# Patient Record
Sex: FEMALE | Race: WHITE | Employment: FULL TIME | URBAN - METROPOLITAN AREA
[De-identification: names, ages, dates, MRNs, and addresses within clinical notes are randomized per-mention and may not be internally consistent; named-entity substitution may affect disease eponyms.]

---

## 2020-05-15 ENCOUNTER — HOSPITAL ENCOUNTER (EMERGENCY)
Age: 25
Discharge: HOME | End: 2020-05-16
Attending: EMERGENCY MEDICINE
Payer: COMMERCIAL

## 2020-05-15 PROCEDURE — 99282 EMERGENCY DEPT VISIT SF MDM: CPT

## 2020-05-15 RX ORDER — SULFAMETHOXAZOLE AND TRIMETHOPRIM 800; 160 MG/1; MG/1
1 TABLET ORAL 2 TIMES DAILY
COMMUNITY
Start: 2020-05-14

## 2020-05-15 RX ORDER — MESALAMINE 1000 MG/1
1000 SUPPOSITORY RECTAL NIGHTLY
COMMUNITY

## 2020-05-15 RX ORDER — MESALAMINE 1.2 G/1
1200 TABLET, DELAYED RELEASE ORAL
COMMUNITY

## 2020-05-15 ASSESSMENT — PAIN SCALES - GENERAL: PAINLEVEL_OUTOF10: 8

## 2020-05-16 VITALS
SYSTOLIC BLOOD PRESSURE: 117 MMHG | HEIGHT: 62 IN | WEIGHT: 188 LBS | RESPIRATION RATE: 15 BRPM | TEMPERATURE: 98.2 F | BODY MASS INDEX: 34.6 KG/M2 | DIASTOLIC BLOOD PRESSURE: 73 MMHG | HEART RATE: 100 BPM | OXYGEN SATURATION: 98 %

## 2020-05-16 NOTE — ED PROVIDER NOTES
[address] [facility]  ED  eMERGENCY dEPARTMENTeNCOUnter      Pt Name: Dora Mejia  MRN: [id number]  [place] 1995  Date of evaluation: 5/15/2020  Provider: Loretta Magdaleno MD    CHIEF COMPLAINT       Chief Complaint   Patient presents with    Abscess     on tailbone, started on monday. HISTORY OF PRESENT ILLNESS   (Location/Symptom, Timing/Onset,Context/Setting, Quality, Duration, Modifying Factors, Severity)  Note limiting factors. Dora Mejia is a 25 y.o. female who presents to the emergency department for pilonidal cyst.  Patient has had it for approximately 1 week. She went to see her PCP yesterday who prescribed her antibiotics and she has a scheduled follow-up appointment in 2 days with general surgery. Approximately an hour and a half prior to being seen the abscess ruptured therefore she came into the emergency room. She denies any fevers, no abdominal pain, no difficulty defecating. Nursing notes were reviewed. REVIEW OF SYSTEMS    (2-9 systems for level 4, 10 or more for level 5)     Review of Systems    Positive and pertinent negative as per HPI. Except as noted above in the ROS, all other systems were reviewed and were negative. PAST MEDICAL HISTORY     Past Medical History:   Diagnosis Date    Chronic bronchitis ([facility] [address]     Ulcerative colitis ([facility] [address]          SURGICALHISTORY       Past Surgical History:   Procedure Laterality Date    CYST REMOVAL           CURRENT MEDICATIONS       Discharge Medication List as of 5/16/2020 12:27 AM      CONTINUE these medications which have NOT CHANGED    Details   sulfamethoxazole-trimethoprim (BACTRIM DS;SEPTRA DS) 800-160 MG per tablet Take 1 tablet by mouth 2 times dailyHistorical Med      mesalamine (CANASA) 1000 MG suppository Place 1,000 mg rectally nightlyHistorical Med      mesalamine (LIALDA) 1.2 g EC tablet Take 1,200 mg by mouth daily (with breakfast) States takes 4 tablets once daily. Historical Med ALLERGIES     Patient has no known allergies. FAMILY HISTORY     History reviewed. No pertinent family history. SOCIAL HISTORY       Social History     Socioeconomic History    Marital status:      Spouse name: None    Number of children: None    Years of education: None    Highest education level: None   Occupational History    None   Social Needs    Financial resource strain: None    Food insecurity     Worry: None     Inability: None    Transportation needs     Medical: None     Non-medical: None   Tobacco Use    Smoking status: Never Smoker   Substance and Sexual Activity    Alcohol use: Yes     Comment: occas    Drug use: Not Currently    Sexual activity: None   Lifestyle    Physical activity     Days per week: None     Minutes per session: None    Stress: None   Relationships    Social connections     Talks on phone: None     Gets together: None     Attends Zoroastrian service: None     Active member of club or organization: None     Attends meetings of clubs or organizations: None     Relationship status: None    Intimate partner violence     Fear of current or ex partner: None     Emotionally abused: None     Physically abused: None     Forced sexual activity: None   Other Topics Concern    None   Social History Narrative    None       SCREENINGS    Mirza Coma Scale  Eye Opening: Spontaneous  Best Verbal Response: Oriented  Best Motor Response: Obeys commands  [place] Coma Scale Score: 15        PHYSICAL EXAM    (up to 7 for level 4, 8 or more for level 5)     ED Triage Vitals [05/15/20 2333]   BP Temp Temp Source Pulse Resp SpO2 Height Weight   133/78 98.7 °F (37.1 °C) Oral 109 16 97 % 5' 2\" (1.575 m) 188 lb (85.3 kg)       Physical Exam  Vitals signs and nursing note reviewed. Constitutional:       Appearance: Normal appearance. She is well-developed. She is not ill-appearing. HENT:      Head: Normocephalic and atraumatic.       Right Ear: External ear normal.